# Patient Record
Sex: FEMALE
[De-identification: names, ages, dates, MRNs, and addresses within clinical notes are randomized per-mention and may not be internally consistent; named-entity substitution may affect disease eponyms.]

---

## 2017-06-24 ENCOUNTER — HOSPITAL ENCOUNTER (EMERGENCY)
Dept: HOSPITAL 42 - ED | Age: 47
LOS: 1 days | Discharge: ANOTHER HOSPITAL | End: 2017-06-25
Payer: MEDICAID

## 2017-06-24 VITALS — BODY MASS INDEX: 33.5 KG/M2

## 2017-06-24 DIAGNOSIS — O00.90: Primary | ICD-10-CM

## 2017-06-24 PROCEDURE — 85730 THROMBOPLASTIN TIME PARTIAL: CPT

## 2017-06-24 PROCEDURE — 84703 CHORIONIC GONADOTROPIN ASSAY: CPT

## 2017-06-24 PROCEDURE — 85610 PROTHROMBIN TIME: CPT

## 2017-06-24 PROCEDURE — 87086 URINE CULTURE/COLONY COUNT: CPT

## 2017-06-24 PROCEDURE — 81001 URINALYSIS AUTO W/SCOPE: CPT

## 2017-06-24 PROCEDURE — 86850 RBC ANTIBODY SCREEN: CPT

## 2017-06-24 PROCEDURE — 86900 BLOOD TYPING SEROLOGIC ABO: CPT

## 2017-06-24 PROCEDURE — 80053 COMPREHEN METABOLIC PANEL: CPT

## 2017-06-24 PROCEDURE — 84702 CHORIONIC GONADOTROPIN TEST: CPT

## 2017-06-24 PROCEDURE — 96374 THER/PROPH/DIAG INJ IV PUSH: CPT

## 2017-06-24 PROCEDURE — 99283 EMERGENCY DEPT VISIT LOW MDM: CPT

## 2017-06-24 PROCEDURE — 96375 TX/PRO/DX INJ NEW DRUG ADDON: CPT

## 2017-06-24 PROCEDURE — 76817 TRANSVAGINAL US OBSTETRIC: CPT

## 2017-06-24 PROCEDURE — 85025 COMPLETE CBC W/AUTO DIFF WBC: CPT

## 2017-06-25 ENCOUNTER — HOSPITAL ENCOUNTER (OUTPATIENT)
Dept: HOSPITAL 14 - H.ER | Age: 47
Setting detail: OBSERVATION
Discharge: HOME | End: 2017-06-25
Attending: EMERGENCY MEDICINE | Admitting: EMERGENCY MEDICINE
Payer: MEDICAID

## 2017-06-25 VITALS
HEART RATE: 80 BPM | OXYGEN SATURATION: 96 % | RESPIRATION RATE: 20 BRPM | SYSTOLIC BLOOD PRESSURE: 130 MMHG | DIASTOLIC BLOOD PRESSURE: 79 MMHG | TEMPERATURE: 98.5 F

## 2017-06-25 VITALS — RESPIRATION RATE: 18 BRPM | SYSTOLIC BLOOD PRESSURE: 144 MMHG | DIASTOLIC BLOOD PRESSURE: 90 MMHG | HEART RATE: 72 BPM

## 2017-06-25 VITALS — TEMPERATURE: 98.3 F | OXYGEN SATURATION: 100 %

## 2017-06-25 VITALS — BODY MASS INDEX: 33.5 KG/M2

## 2017-06-25 DIAGNOSIS — Z3A.00: ICD-10-CM

## 2017-06-25 DIAGNOSIS — O20.0: Primary | ICD-10-CM

## 2017-06-25 DIAGNOSIS — J45.909: ICD-10-CM

## 2017-06-25 DIAGNOSIS — I10: ICD-10-CM

## 2017-06-25 LAB
ADD MANUAL DIFF?: NO
ALBUMIN/GLOB SERPL: 1.3 {RATIO} (ref 1.1–1.8)
ALP SERPL-CCNC: 84 U/L (ref 38–133)
ALT SERPL-CCNC: 34 U/L (ref 7–56)
APPEARANCE UR: (no result)
APTT BLD: 26.2 SECONDS (ref 23.7–30.8)
AST SERPL-CCNC: 21 U/L (ref 15–39)
BACTERIA #/AREA URNS HPF: (no result) /[HPF]
BASOPHILS # BLD AUTO: 0.08 K/MM3 (ref 0–2)
BASOPHILS NFR BLD: 0.7 % (ref 0–3)
BILIRUB SERPL-MCNC: 0.2 MG/DL (ref 0.2–1.3)
BILIRUB UR-MCNC: NEGATIVE MG/DL
BUN SERPL-MCNC: 11 MG/DL (ref 7–21)
CALCIUM SERPL-MCNC: 8.4 MG/DL (ref 8.4–10.5)
CHLORIDE SERPL-SCNC: 104 MMOL/L (ref 98–107)
CO2 SERPL-SCNC: 24 MMOL/L (ref 21–33)
COLOR UR: (no result)
EOSINOPHIL # BLD: 0.2 10*3/UL (ref 0–0.7)
EOSINOPHIL NFR BLD: 1.8 % (ref 1.5–5)
ERYTHROCYTE [DISTWIDTH] IN BLOOD BY AUTOMATED COUNT: 12.9 % (ref 11.5–14.5)
GLOBULIN SER-MCNC: 2.9 GM/DL
GLUCOSE SERPL-MCNC: 129 MG/DL (ref 70–110)
GLUCOSE UR STRIP-MCNC: 250 MG/DL
GRANULOCYTES # BLD: 8.07 10*3/UL (ref 1.4–6.5)
GRANULOCYTES NFR BLD: 70.7 % (ref 50–68)
HCT VFR BLD CALC: 38.9 % (ref 36–48)
INR PPP: 1.02 (ref 0.93–1.08)
KETONES UR STRIP-MCNC: (no result) MG/DL
LEUKOCYTE ESTERASE UR-ACNC: (no result) LEU/UL
LYMPHOCYTES # BLD: 2.5 10*3/UL (ref 1.2–3.4)
LYMPHOCYTES NFR BLD AUTO: 21.5 % (ref 22–35)
MCH RBC QN AUTO: 30.6 PG (ref 25–35)
MCHC RBC AUTO-ENTMCNC: 34.7 G/DL (ref 31–37)
MCV RBC AUTO: 88.2 FL (ref 80–105)
MONOCYTES # BLD AUTO: 0.6 10*3/UL (ref 0.1–0.6)
MONOCYTES NFR BLD: 5.3 % (ref 1–6)
PH UR STRIP: 8 [PH] (ref 4.7–8)
PLATELET # BLD: 258 10^3/UL (ref 120–450)
PMV BLD AUTO: 8.9 FL (ref 7–11)
POTASSIUM SERPL-SCNC: 4.4 MMOL/L (ref 3.6–5)
PROT SERPL-MCNC: 6.6 G/DL (ref 5.8–8.3)
PROT UR STRIP-MCNC: >=300 MG/DL
RBC # UR STRIP: (no result) /UL
RBC #/AREA URNS HPF: (no result) /HPF (ref 0–2)
SODIUM SERPL-SCNC: 135 MMOL/L (ref 132–148)
SP GR UR STRIP: 1.01 (ref 1–1.03)
UROBILINOGEN UR STRIP-ACNC: 2 E.U./DL
WBC # BLD AUTO: 11.4 10^3/UL (ref 4.5–11)
WBC #/AREA URNS HPF: (no result) /HPF (ref 0–6)

## 2017-06-25 NOTE — ED PDOC
HPI: Female  Pain


Time Seen by Provider: 06/25/17 04:28


Chief Complaint (Nursing): Female Genitourinary


Chief Complaint (Provider): Vaginal bleeding


Additional Complaint(s): 





Sent from [place] [facility] for evaluation of ectopic pregnancy. Pt reports mild 

pressurelike pain, and mild bleeding. 





Past Medical History


Vital Signs: 





 Last Vital Signs











Temp  98.3 F   06/25/17 04:14


 


Pulse  78   06/25/17 04:14


 


Resp  16   06/25/17 04:14


 


BP  129/73   06/25/17 04:14


 


Pulse Ox  100   06/25/17 04:14














- Medical History


PMH: Asthma, HTN


   Denies: Chronic Kidney Disease





- Home Medications


Home Medications: 


 Ambulatory Orders











 Medication  Instructions  Recorded


 


Lisinopril [Zestril] 10 mg PO DAILY 06/24/17














- Allergies


Allergies/Adverse Reactions: 


 Allergies











Allergy/AdvReac Type Severity Reaction Status Date / Time


 


No Known Allergies Allergy   Verified 06/24/17 23:40














- ECG


O2 Sat by Pulse Oximetry: 100





- Progress


ED Course And Treament: 





PRASANTH Lopez who requests repeat US.

## 2017-06-25 NOTE — US
EXAM:

  US Pregnancy First Trimester, Transabdominal



CLINICAL HISTORY:

  47 years old, female; Signs and symptoms; Lmp or gestational age (in weeks): 

17; Antepartum complications; Hemorrhage; Additional info: R/O ectopic 

pregnancy



TECHNIQUE:

  Real-time transabdominal obstetrical ultrasound of the maternal pelvis and a 

first trimester pregnancy with image documentation.



COMPARISON:

  No relevant prior studies available.



FINDINGS:

  Gestation:  No intrauterine gestational sac.

  Uterus/cervix:  Endometrium: 1.0 cm in thickness.  Closed cervix.  5.8 x 5.9 

x 5.6 cm uterine mass.

  Ovaries:  Normal ovaries.  No adnexal masses.

  Free fluid:  Trace free fluid within pelvis.



IMPRESSION:     

1.  No intrauterine gestation. DDX: Early IUP, missed , ectopic 

pregnancy.  

2.  Probable fibroid.

3.  Incidental/non-acute findings are described above.



_______________________________________________



EXAM:

  US Pregnancy, Transvaginal



CLINICAL HISTORY:

  47 years old, female; Signs and symptoms; Lmp or gestational age (in weeks): 

17; Antepartum complications; Hemorrhage; Additional info: R/O ectopic 

pregnancy



TECHNIQUE:

  Real-time transvaginal obstetrical ultrasound of the maternal pelvis and a 

first trimester pregnancy with image documentation.  Transvaginal imaging was 

used for better evaluation of the fetus and adnexa.



COMPARISON:

  No relevant prior studies available.



FINDINGS:

  Gestation:  No intrauterine gestational sac.

  Uterus/cervix:  Endometrium: 1.0 cm in thickness.  Closed cervix.  5.8 x 5.9 

x 5.6 cm uterine mass.

  Ovaries:  Normal ovaries.  No adnexal masses.

  Free fluid:  Trace free fluid within pelvis.



IMPRESSION:     

1.  No intrauterine gestation. DDX: Early IUP, missed , ectopic 

pregnancy.  

2.  Probable fibroid.

3.  Incidental/non-acute findings are described above.

## 2017-06-25 NOTE — ED PDOC
Arrival/HPI





- General


Chief Complaint: Female Genitourinary


Time Seen by Provider: 17 00:02


Historian: Patient





- History of Present Illness


Narrative History of Present Illness (Text): 





17 00:26


Patient reports 2 hour history of heavy vaginal bleeding with clots associated 

with lower abdominal pain. Patient states that she had an IUD placed in April 

by her GYN, afterwards for 2 months she did not get her menses. Otherwise: (-) N

/V, (-) fever, (-) urinary symptoms, (-) prior salpingitis, (-) prior ectopic 

pregnancy. Of note, after further questioning the patient admits that she is 

pregnant and that she did a pregnancy test 3 days ago which was positive. She 

admits that she did not notify her GYN that she is pregnant despite knowing she 

has an IUD in place.








PMD [facility]


GYN clinic [facility] Medical History





- Provider Review


Nursing Documentation Reviewed: Yes





- Infectious Disease


Hx of Infectious Diseases: None





- Tetanus Immunization


Tetanus Immunization: Unknown





- Cardiac


Hx Cardiac Disorders: Yes


Hx Hypertension: Yes





- Pulmonary


Hx Respiratory Disorders: Yes


Hx Asthma: Yes





- Neurological


Hx Neurological Disorder: No





- HEENT


Hx HEENT Disorder: No





- Renal


Hx Renal Disorder: No





- Endocrine/Metabolic


Hx Endocrine Disorders: No





- Hematological/Oncological


Hx Blood Disorders: No





- Integumentary


Hx Dermatological Disorder: No





- Musculoskeletal/Rheumatological


Hx Musculoskeletal Disorders: No





- Gastrointestinal


Hx Gastrointestinal Disorders: No





- Genitourinary/Gynecological


Hx Genitourinary Disorders: No





- Psychiatric


Hx Psychophysiologic Disorder: No


Hx Substance Use: No





- Past Surgical History


Past Surgical History: No Previous





- Surgical History


Hx  Section: Yes (2)





- Anesthesia


Hx Anesthesia: No





- Suicidal Assessment


Feels Threatened In Home Enviroment: No





Family/Social History





- Physician Review


Nursing Documentation Reviewed: Yes


Family/Social History: No Known Family HX


Smoking Status: Never Smoked


Hx Alcohol Use: No


Hx Substance Use: No





Allergies/Home Meds


Allergies/Adverse Reactions: 


Allergies





No Known Allergies Allergy (Verified 17 23:40)


 








Home Medications: 


 Home Meds











 Medication  Instructions  Recorded  Confirmed


 


Lisinopril [Zestril] 10 mg PO DAILY 17














Review of Systems





- Review of Systems


Constitutional: Normal.  absent: Fatigue, Weight Change, Fevers


Respiratory: Normal.  absent: SOB, Cough, Sputum


Cardiovascular: Normal.  absent: Chest Pain, Palpitations


Gastrointestinal: Normal, Abdominal Pain.  absent: Stool Changes, Appetite 

Changes


Genitourinary Female: Normal, Vaginal Bleeding.  absent: Dysuria, Frequency, 

Hematuria


Musculoskeletal: Normal.  absent: Arthralgias, Back Pain, Neck Pain


Skin: Normal.  absent: Rash, Pruritis, Skin Lesions





Physical Exam


Vital Signs











  Temp Pulse Resp BP Pulse Ox


 


 17 23:42  98.3 F  90  18  141/110 H  97


 


 17 23:39  98.3 F  90  18  141/110 H  97











Temperature: Afebrile


Blood Pressure: Hypertensive


Pulse: Regular


Respiratory Rate: Normal


Appearance: Positive for: Well-Appearing


Pain Distress: Moderate


Mental Status: Positive for: Alert and Oriented X 3





- Systems Exam


Head: Present: Atraumatic, Normocephalic


Mouth: Present: Moist Mucous Membranes


Pharnyx: Present: Normal


Neck: Present: Normal Range of Motion.  No: MIDLINE TENDERNESS


Respiratory/Chest: Present: Clear to Auscultation, Good Air Exchange.  No: 

Respiratory Distress, Accessory Muscle Use, Wheezes, Rales, Rhonchi


Cardiovascular: Present: Regular Rate and Rhythm, Normal S1, S2.  No: Murmurs


Abdomen: Present: Tenderness (moderate lower abd tenderness).  No: Distention, 

Normal Bowel Sounds, Peritoneal Signs, Rebound, Guarding


Genitourinary/Pelvic Exam: Present: Normal External Genitalia, Vaginal Bleeding 

(+heavy vaginal bleeding with clots), Cervical os Closed (+string of the IUD 

noted), Other (ER RN present during entire exam. ).  No: Adenexal Tenderness, 

Cervical Motion Tendernes


Upper Extremity: Present: Normal Inspection, Normal ROM, NORMAL PULSES, 

Neurovascularly Intact, Capillary Refill < 2s.  No: Edema


Lower Extremity: Present: Normal Inspection, NORMAL PULSES, Normal ROM, 

Neurovascularly Intact, Capillary Refill < 2 s.  No: Edema, Tenderness


Neurological: Present: GCS=15, CN II-XII Intact


Skin: Present: Warm, Dry, Normal Color.  No: Rashes


Psychiatric: Present: Alert, Oriented x 3





Medical Decision Making


ED Course and Treatment: 





17 00:21


48 yo F presents for lower abdominal pain and heavy vaginal bleeding with 

clots. Uhcg (+). R/o ectopic pregnancy vs miscarriage. 





Plan:


-- Labs


-- IV fluids


-- Urinalysis


-- GYN consult


-- Morphine / Zofran 


-- Reassess and disposition


-- TV US 





GYN consult called stat and case d/w Dr. Mendoza, recommends to order labs 

and US for now and if US confirms ectopic to transfer the patient to [place]. 

US called stat. 








17 01:52


On reevaluation, patient is laying in bed comfortably. Patient reports 

significant improvement of pain. On exam, abdomen still soft with moderate 

suprapubic tenderness. Ultrasound done, as per ultrasound tech she sees no IUP, 

the fallopian tubes were not well-visualized, and she did not see an IUD as 

well. Official radiology reading pending.





Considering history, (+) uhcg, beta quant 2,800, with US that shows no IUP, 

patient likely has an ectopic pregnancy, possible miscarriage, will need to be 

transferred to [facility]. Case d/w Dr. Villegas, OB hospitalist, agrees with plan to 

transfer and will be the accepting physician, request that IUD be removed by 

PA. 





Patient made aware of plan of care and agrees with plan. The patient was given 

the opportunity to ask questions. Arrangements made for transfer. 


Pelvic exam performed with ER RN at the bedside, (+) heavy bleeding with clots, 

IUD removed by PA. 





[place] ER called and notified of transfer. Transport arranged. 











- Lab Interpretations


Lab Results: 








 17 00:24 





 17 00:24 





 Lab Results





17 00:24: Blood Type Pending, Antibody Screen Pending, BBK History 

Checked No verified bt


17 00:24: Beta HCG, Quant 2888.70 H


17 00:24: Sodium 135, Potassium 4.4, Chloride 104, Carbon Dioxide 24, 

Anion Gap 11, BUN 11, Creatinine 0.6, Est GFR (African Amer) > 60, Est GFR (Non-

Af Amer) > 60, Random Glucose 129 H, Calcium 8.4, Total Bilirubin 0.2, AST 21, 

ALT 34, Alkaline Phosphatase 84, Total Protein 6.6, Albumin 3.8, Globulin 2.9, 

Albumin/Globulin Ratio 1.3


17 00:24: Urine Color Red, Urine Appearance Bloody, Urine pH 8.0, Ur 

Specific Gravity 1.015, Urine Protein >=300 H, Urine Glucose (UA) 250 H, Urine 

Ketones Trace H, Urine Blood Large H, Urine Nitrate Positive H, Urine Bilirubin 

Negative, Urine Urobilinogen 2.0 H, Ur Leukocyte Esterase Moderate H, Urine RBC 

Tntc, Urine WBC Tntc, Urine Bacteria Mod, Urine HCG, Qual Positive


17 00:24: PT 11.0, INR 1.02, APTT 26.2


17 00:24: WBC 11.4 H, RBC 4.41, Hgb 13.5, Hct 38.9, MCV 88.2, MCH 30.6, 

MCHC 34.7, RDW 12.9, Plt Count 258, MPV 8.9, Gran % 70.7 H, Lymph % (Auto) 21.5 

L, Mono % (Auto) 5.3, Eos % (Auto) 1.8, Baso % (Auto) 0.7, Gran # 8.07 H, Lymph 

# 2.5, Mono # 0.6, Eos # 0.2, Baso # 0.08











- RAD Interpretation


Radiology Orders: 








17 00:16


OB TRANSVAGINAL PREGNANCY [US] Stat 














- Medication Orders


Current Medication Orders: 











Discontinued Medications





Sodium Chloride (Sodium Chloride 0.9%)  1,000 mls @ 1,000 mls/hr IV .Q1H STA


   Stop: 17 01:23


   Last Admin: 17 00:38  Dose: 1,000 mls/hr





Morphine Sulfate (Morphine)  4 mg IVP STAT STA


   Stop: 17 00:17


   Last Admin: 17 00:38  Dose: 4 mg





Ondansetron HCl (Zofran Inj)  4 mg IVP STAT STA


   Stop: 17 00:17


   Last Admin: 17 00:39  Dose: 4 mg











- PA / NP / Resident Statement


MD/DO has reviewed & agrees with the documentation as recorded.





Disposition/Present on Arrival





- Present on Arrival


Any Indicators Present on Arrival: No


History of DVT/PE: No


History of Uncontrolled Diabetes: No


Urinary Catheter: No


History of Decub. Ulcer: No


History Surgical Site Infection Following: None





- Disposition


Have Diagnosis and Disposition been Completed?: Yes


Diagnosis: 


 Ectopic pregnancy





Disposition: Transfer [facility]


Disposition Time: 02:00


Patient Plan: Transfer To ([facility] )


Patient Problems: 


 Current Active Problems











Problem Status Onset


 


Ectopic pregnancy Acute  











Condition: STABLE


Referrals: 


Karla Yuan DO [Primary Care Provider] - Follow up with primary

## 2017-06-25 NOTE — ED PDOC
- ECG


O2 Sat by Pulse Oximetry: 100





Medical Decision Making


Medical Decision Makin;00


patient signed out to me by Dr. Johnson pending US, re-eval, and final 

disposition.








9:20


patient seen by Dr. Galindo and advises for repeat beta count in 2 days. 

patient is no longer bleeding as per Dr. Galindo. The doctor does not believe 

this to be an ecoptic pregnancy. Patient's vitals are stable. Re-evaluation. 

Patient feels better. Discussed results and plan with patient who expresses 

understanding. Counseling was provided regarding the diagnosis and prognosis. 

All questions answered and there is agreement with the plan to discharge home 

with instructions. Patient stable for discharge. Return if symptoms persist or 

worsen.





Disposition





- Clinical Impression


Clinical Impression: 


 Threatened 








- POA


Present On Arrival: None





- Disposition


Disposition: Routine/Home


Disposition Time: 09:20


Condition: STABLE

## 2017-06-25 NOTE — US
EXAM:

  US Pregnancy First Trimester, Transabdominal



CLINICAL HISTORY:

  47 years old, female; Signs and symptoms; Lmp or gestational age (in weeks): 

2017; Other: Heavy bleeding with clots; Additional info: Abd pain, 

vaginal bleeding, R/O ectopic



TECHNIQUE:

  Real-time transabdominal obstetrical ultrasound of the maternal pelvis and a 

first trimester pregnancy with image documentation.



COMPARISON:

  No relevant prior studies available.



FINDINGS:

  Gestation:  No intrauterine gestational sac.

  Uterus/cervix:  Endometrium: 1.0 cm in thickness.  Closed cervix.  Two 

uterine masses, larger measuring 7.2 x 3.8 x 4.1 cm.

  Ovaries:  RIGHT ovary: Normal.  LEFT ovary: Not visualized.  No adnexal 

masses.

  Free fluid:  No significant free fluid.



IMPRESSION:     

1.  No intrauterine gestation. DDX: Early IUP, missed , ectopic 

pregnancy.  

2.  Probable fibroid uterus.

3.  Incidental/non-acute findings are described above.



_______________________________________________



EXAM:

  US Pregnancy, Transvaginal



CLINICAL HISTORY:

  47 years old, female; Signs and symptoms; Lmp or gestational age (in weeks): 

2017; Other: Heavy bleeding with clots; Additional info: Abd pain, 

vaginal bleeding, R/O ectopic



TECHNIQUE:

  Real-time transvaginal obstetrical ultrasound of the maternal pelvis and a 

first trimester pregnancy with image documentation.  Transvaginal imaging was 

used for better evaluation of the fetus and adnexa.



COMPARISON:

  No relevant prior studies available.



FINDINGS:

  Gestation:  No intrauterine gestational sac.

  Uterus/cervix:  Endometrium: 1.0 cm in thickness.  Closed cervix.  Two 

uterine masses, larger measuring 7.2 x 3.8 x 4.1 cm.

  Ovaries:  RIGHT ovary: Normal.  LEFT ovary: Not visualized.  No adnexal 

masses.

  Free fluid:  No significant free fluid.



IMPRESSION:     

1.  No intrauterine gestation. DDX: Early IUP, missed , ectopic 

pregnancy.  

2.  Probable fibroid uterus.

3.  Incidental/non-acute findings are described above.

## 2017-06-27 ENCOUNTER — HOSPITAL ENCOUNTER (EMERGENCY)
Dept: HOSPITAL 14 - H.ER | Age: 47
Discharge: HOME | End: 2017-06-27
Payer: MEDICAID

## 2017-06-27 VITALS
OXYGEN SATURATION: 99 % | SYSTOLIC BLOOD PRESSURE: 138 MMHG | HEART RATE: 93 BPM | TEMPERATURE: 98 F | RESPIRATION RATE: 20 BRPM | DIASTOLIC BLOOD PRESSURE: 81 MMHG

## 2017-06-27 VITALS — BODY MASS INDEX: 33.5 KG/M2

## 2017-06-27 DIAGNOSIS — O20.0: Primary | ICD-10-CM

## 2017-06-27 NOTE — ED PDOC
HPI: Female  Pain


Time Seen by Provider: 17 11:35


Chief Complaint (Nursing): Female Genitourinary


Chief Complaint (Provider): Repeat beta hcg


History Per: Patient


History/Exam Limitations: no limitations


Onset/Duration Of Symptoms: Days


Current Symptoms Are (Timing): Still Present


Additional Complaint(s): 


The pt is a 48yo female, presents to the ED for repeat beta-hcg levels as 

instructed by her OBGYN. Pt was seen in this facility couple days ago to rule 

out ectopic pregnancy; pt was seen by an OB in the facility who felt the 

symptoms indicated a threatened ab. Pt today states her abdominal pain as well 

as vaginal bleeding have both decreased. She denies any new medical complaints.








Past Medical History


Reviewed: Historical Data, Nursing Documentation, Vital Signs


Vital Signs: 





 Last Vital Signs











Temp  98.0 F   17 11:55


 


Pulse  93 H  17 11:55


 


Resp  20   17 11:55


 


BP  138/81   17 11:55


 


Pulse Ox  99   17 11:55














- Medical History


PMH: Asthma, HTN


   Denies: Chronic Kidney Disease





- Surgical History


Surgical History: No Surg Hx





- Family History


Family History: States: Unknown Family Hx





- Living Arrangements


Living Arrangements: With Family





- Social History


Current smoker - smoking cessation education provided: No


Alcohol: None


Drugs: Denies





- Home Medications


Home Medications: 


 Ambulatory Orders











 Medication  Instructions  Recorded


 


Lisinopril [Zestril] 10 mg PO DAILY 17


 


Ibuprofen [Motrin] 600 mg PO Q6H PRN #20 tab 17














- Allergies


Allergies/Adverse Reactions: 


 Allergies











Allergy/AdvReac Type Severity Reaction Status Date / Time


 


No Known Allergies Allergy   Verified 17 11:55














Review of Systems


ROS Statement: Except As Marked, All Systems Reviewed And Found Negative


Gastrointestinal: Positive for: Abdominal Pain


Genitourinary Female: Positive for: Vaginal Bleeding





Physical Exam





- Reviewed


Nursing Documentation Reviewed: Yes


Vital Signs Reviewed: Yes





- Physical Exam


Appears: Positive for: Well, Non-toxic, No Acute Distress


Head Exam: Positive for: ATRAUMATIC, NORMAL INSPECTION, NORMOCEPHALIC


Skin: Positive for: Normal Color, Warm, DRY


Eye Exam: Positive for: Normal appearance


Neck: Positive for: Normal, Supple


Cardiovascular/Chest: Positive for: Regular Rate, Rhythm


Respiratory: Positive for: Normal Breath Sounds.  Negative for: Respiratory 

Distress


Gastrointestinal/Abdominal: Positive for: Soft, Tenderness (mild suprapubic 

tenderness).  Negative for: Guarding, Rebound


Extremity: Positive for: Normal ROM.  Negative for: Deformity, Swelling


Neurologic/Psych: Positive for: Alert, Oriented





- ECG


O2 Sat by Pulse Oximetry: 99





Medical Decision Making


Medical Decision Making: 


Time: 1200


Impression: Repeat beta-HCG


Plan:


-- Beta HCG


-- Reassess


Time: 1345


Case discussed with Dr. Leyva who spoke with Dr. Galindo, OB on call who 

informed the pt is stable for d.c home with follow up to pt's personal OB in 1 

week.


Scribe Attestation:


Documented by Meredith George acting as a scribe for Piper Pires MD. 


Provider Attestation:


All medical record entries made by the Scribe were at my direction and 

personally dictated by me. I have reviewed the chart and agree that the record 

accurately reflects my personal performance of the history, physical exam, 

medical decision making, and the department course for this patient. I have 

also personally directed, reviewed, and agree with the discharge instructions 

and disposition.





Disposition





- Clinical Impression


Clinical Impression: 


 Spontaneous 








- Disposition


Disposition: Routine/Home


Disposition Time: 13:45


Condition: STABLE


Additional Instructions: 


FOLLOW-UP WITH YOUR OB-GYN IN ONE WEEK FOR REEVALUATION. 


Prescriptions: 


Ibuprofen [Motrin] 600 mg PO Q6H PRN #20 tab


 PRN Reason: Pain, Moderate (4-7)


Instructions:  Spontaneous Miscarriage (ED)


Print Language: Palestinian

## 2018-08-06 ENCOUNTER — HOSPITAL ENCOUNTER (EMERGENCY)
Dept: HOSPITAL 42 - ED | Age: 48
Discharge: HOME | End: 2018-08-06
Payer: COMMERCIAL

## 2018-08-06 VITALS — TEMPERATURE: 97.9 F | OXYGEN SATURATION: 98 %

## 2018-08-06 VITALS — RESPIRATION RATE: 20 BRPM | SYSTOLIC BLOOD PRESSURE: 143 MMHG | HEART RATE: 89 BPM | DIASTOLIC BLOOD PRESSURE: 77 MMHG

## 2018-08-06 VITALS — BODY MASS INDEX: 36.6 KG/M2

## 2018-08-06 DIAGNOSIS — S39.012A: ICD-10-CM
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
## 2018-08-06 DIAGNOSIS — S20.219A: Primary | ICD-10-CM

## 2018-08-06 DIAGNOSIS — I10: ICD-10-CM

## 2018-08-06 NOTE — RAD
HISTORY:



COMPARISON:

No prior.



TECHNIQUE:

Chest PA and lateral



FINDINGS:



LINES AND TUBES:

None. 



LUNG AND PLEURA:

The lungs are well inflated and clear. No pleural effusion or 

pneumothorax.



HEART AND MEDIASTINUM:

The heart is not enlarged. The hilar and mediastinal contours are 

within normal limits.



SKELETAL STRUCTURES:

The bony structures are within normal limits for the patient's age.



VISUALIZED UPPER ABDOMEN:

Normal.



OTHER FINDINGS:

None.



IMPRESSION:

No acute findings.
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
## 2018-08-06 NOTE — RAD
Date of service: 



08/06/2018



PROCEDURE:  Radiographs of the Lumbar Spine.



HISTORY:

back pain r/o fx







COMPARISON:

No prior.



FINDINGS:



BONES:

Normal alignment. No listhesis. No fracture.



DISC SPACES:

Unremarkable.



OTHER FINDINGS:

None.



IMPRESSION:

Unremarkable radiographs of the lumbar spine.

## 2018-08-06 NOTE — RAD
Date of service: 



08/06/2018



HISTORY:

back pain r/o fracture







COMPARISON:

No prior.



FINDINGS:



BONES:

Alignment maintained. No fracture.



DISC SPACES:

Normal.



SOFT TISSUES:

Normal.



OTHER FINDINGS:

None.



IMPRESSION:

Normal radiographs of the thoracic spine.

## 2018-08-06 NOTE — CARD
--------------- APPROVED REPORT --------------





Date of service: 08/06/2018



EKG Measurement

Heart [id number]

MS [id number]

[id number]

[id number]

[id number]



<Conclusion>

*** Poor data quality, interpretation may be adversely affected

Normal sinus rhythm

Normal ECG

## 2024-11-15 NOTE — CP.PCM.CON
<Gabe Gonzalez - Last Filed: 17 10:02>





History of Present Illness





- History of Present Illness


History of Present Illness: 





Consult note for OB





Hx taken from patient





46 y/o F patient with PMHx of HTN presented to [facility] c/o vaginal bleeding 

and lower back pain since last night. Patient states she first noticed a lot of 

clots coming out from her vagina and after that, persistent VB associated with 

pain that was crampy. LMP 17. As per patient she had an IUD at the time 

she presented to ED and she has had irregular periods for a while. She is 

sexually active. Denies vomiting, nausea, CP, palpitations, headaches, vision 

changes. At [facility] IUD was removed by attendant and she was transferred to 

our hospital for further eval. At the time of eval patient states pain and 

bleeding have improved. She denies any trauma or any particular events that she 

can relate to the onset of bleeding.


ED course:


 WBC: 11.4


 Hgb 13.5


 Coags WNL


 Beta HCG 2888.7


 TV US: No IUP, Possible fibroid(please see full report)


 IV fluids, Pain control, Zofran





PMHx: HTN


Meds: Metoprolol 25mg daily


Allergies: NKDA


SxHx: C-Sect x2


OB Hx: , LMP 17, IUD








Review of Systems





- Constitutional


Constitutional: As Per HPI





- EENT


Eyes: As Per HPI





- Cardiovascular


Cardiovascular: As Per HPI





- Respiratory


Respiratory: As Per HPI





- Gastrointestinal


Gastrointestinal: As Per HPI





- Genitourinary


Genitourinary: As Per HPI





- Menstruation


Menstruation: As Per HPI





- Neurological


Neurological: As Per HPI





Past Patient History





- Infectious Disease


Hx of Infectious Diseases: None





- Tetanus Immunizations


Tetanus Immunization: Unknown





- Past Social History


Smoking Status: Never Smoked





- CARDIAC


Hx Hypertension: Yes





- PULMONARY


Hx Asthma: Yes





- NEUROLOGICAL


Hx Neurological Disorder: No





- HEENT


Hx HEENT Problems: No





- RENAL


Hx Chronic Kidney Disease: No





- ENDOCRINE/METABOLIC


Hx Endocrine Disorders: No





- HEMATOLOGICAL/ONCOLOGICAL


Hx Blood Disorders: No





- INTEGUMENTARY


Hx Dermatological Problems: No





- MUSCULOSKELETAL/RHEUMATOLOGICAL


Hx Musculoskeletal Disorders: No





- GASTROINTESTINAL


Hx Gastrointestinal Disorders: No





- GENITOURINARY/GYNECOLOGICAL


Hx Genitourinary Disorders: No





- PSYCHIATRIC


Hx Psychophysiologic Disorder: No


Hx Substance Use: No





- SURGICAL HISTORY


Hx  Section: Yes (2)





- ANESTHESIA


Hx Anesthesia: No





Meds


Allergies/Adverse Reactions: 


 Allergies











Allergy/AdvReac Type Severity Reaction Status Date / Time


 


No Known Allergies Allergy   Verified 17 11:55














Physical Exam





- Constitutional


Appears: Non-toxic, No Acute Distress





- Head Exam


Head Exam: NORMAL INSPECTION





- Eye Exam


Eye Exam: EOMI, PERRL





- ENT Exam


ENT Exam: Mucous Membranes Moist





- Neck Exam


Neck exam: Positive for: Normal Inspection





- Respiratory Exam


Respiratory Exam: Clear to Auscultation Bilateral, NORMAL BREATHING PATTERN





- Cardiovascular Exam


Cardiovascular Exam: REGULAR RHYTHM, +S1, +S2





- GI/Abdominal Exam


GI & Abdominal Exam: Normal Bowel Sounds, Soft, Tenderness (mild diffuse 

tenderness in lower abdomen).  absent: Distended, Guarding, Rebound, Rigid





-  Exam


Speculum exam: Vaginal Bleeding (No active bleeding. There is very small amount 

of blood in the ext orif.).  absent: Foreign Body, Laceration, Tissue, Vaginal 

Discharge





- Extremities Exam


Extremities exam: Positive for: normal capillary refill, normal inspection





- Neurological Exam


Neurological exam: Alert, Oriented x3, Reflexes Normal





- Psychiatric Exam


Psychiatric exam: Normal Affect, Normal Mood





- Skin


Skin Exam: Normal Color, Warm





Results





- Vital Signs


Recent Vital Signs: 


 Last Vital Signs











Temp  98.3 F   17 04:14


 


Pulse  72   17 09:00


 


Resp  18   17 09:00


 


BP  144/90   17 09:00


 


Pulse Ox  100   17 09:43














Assessment & Plan





- Assessment and Plan (Free Text)


Assessment: 





46 y/o F with PMHx of HTN presents for evaluation of VB and abd pain





-SAB vs Ectopic pregnancy


-Improved


-Most likely SAB


-Beta HCG 2888.7


-TV US no fetal parts


-Hgb and coags WNL


-Hx of IUD 


-Will F/U with serial Beta HCG. 


  Return next 17 for repeat Beta HCG to ED


-If severe bleeding, pain or fever return to ED





<Tali Galindo S - Last Filed: 17 00:02>





History of Present Illness





- History of Present Illness


History of Present Illness: 





Pt seen & examined by me with dr. gonzalez.  Agree with above assessment and 

plan.





Results





- Vital Signs


Recent Vital Signs: 


 Last Vital Signs











Temp  98.3 F   17 04:14


 


Pulse  72   17 09:00


 


Resp  18   17 09:00


 


BP  144/90   17 09:00


 


Pulse Ox  100   17 09:43 Please inform patient of results.     1. Hold Coumadin.  Repeat INR on 11/18/2024